# Patient Record
Sex: MALE | Race: ASIAN | NOT HISPANIC OR LATINO
[De-identification: names, ages, dates, MRNs, and addresses within clinical notes are randomized per-mention and may not be internally consistent; named-entity substitution may affect disease eponyms.]

---

## 2017-04-06 ENCOUNTER — APPOINTMENT (OUTPATIENT)
Dept: CARDIOLOGY | Facility: CLINIC | Age: 77
End: 2017-04-06

## 2017-04-06 VITALS
HEART RATE: 74 BPM | SYSTOLIC BLOOD PRESSURE: 105 MMHG | OXYGEN SATURATION: 95 % | BODY MASS INDEX: 23.5 KG/M2 | DIASTOLIC BLOOD PRESSURE: 67 MMHG | TEMPERATURE: 97.4 F | RESPIRATION RATE: 16 BRPM | WEIGHT: 148 LBS

## 2017-04-06 DIAGNOSIS — R07.9 CHEST PAIN, UNSPECIFIED: ICD-10-CM

## 2017-10-11 ENCOUNTER — NON-APPOINTMENT (OUTPATIENT)
Age: 77
End: 2017-10-11

## 2017-10-11 ENCOUNTER — APPOINTMENT (OUTPATIENT)
Dept: CARDIOLOGY | Facility: CLINIC | Age: 77
End: 2017-10-11
Payer: MEDICARE

## 2017-10-11 VITALS
OXYGEN SATURATION: 95 % | BODY MASS INDEX: 23.18 KG/M2 | WEIGHT: 146 LBS | DIASTOLIC BLOOD PRESSURE: 65 MMHG | SYSTOLIC BLOOD PRESSURE: 110 MMHG | TEMPERATURE: 98.3 F | RESPIRATION RATE: 17 BRPM | HEART RATE: 84 BPM

## 2017-10-11 DIAGNOSIS — J44.9 CHRONIC OBSTRUCTIVE PULMONARY DISEASE, UNSPECIFIED: ICD-10-CM

## 2017-10-11 PROCEDURE — 93000 ELECTROCARDIOGRAM COMPLETE: CPT | Mod: 59

## 2017-10-11 PROCEDURE — 99214 OFFICE O/P EST MOD 30 MIN: CPT

## 2018-04-11 ENCOUNTER — APPOINTMENT (OUTPATIENT)
Dept: CARDIOLOGY | Facility: CLINIC | Age: 78
End: 2018-04-11
Payer: MEDICARE

## 2018-04-11 ENCOUNTER — NON-APPOINTMENT (OUTPATIENT)
Age: 78
End: 2018-04-11

## 2018-04-11 VITALS
BODY MASS INDEX: 23.18 KG/M2 | WEIGHT: 146 LBS | OXYGEN SATURATION: 95 % | DIASTOLIC BLOOD PRESSURE: 58 MMHG | TEMPERATURE: 98.6 F | SYSTOLIC BLOOD PRESSURE: 100 MMHG | RESPIRATION RATE: 17 BRPM

## 2018-04-11 PROCEDURE — 93000 ELECTROCARDIOGRAM COMPLETE: CPT | Mod: 59

## 2018-04-11 PROCEDURE — 99215 OFFICE O/P EST HI 40 MIN: CPT

## 2018-09-21 ENCOUNTER — NON-APPOINTMENT (OUTPATIENT)
Age: 78
End: 2018-09-21

## 2018-09-21 ENCOUNTER — APPOINTMENT (OUTPATIENT)
Dept: CARDIOLOGY | Facility: CLINIC | Age: 78
End: 2018-09-21
Payer: MEDICARE

## 2018-09-21 VITALS
RESPIRATION RATE: 18 BRPM | OXYGEN SATURATION: 95 % | WEIGHT: 140 LBS | SYSTOLIC BLOOD PRESSURE: 108 MMHG | DIASTOLIC BLOOD PRESSURE: 66 MMHG | BODY MASS INDEX: 22.23 KG/M2 | HEART RATE: 74 BPM

## 2018-09-21 DIAGNOSIS — R05 COUGH: ICD-10-CM

## 2018-09-21 DIAGNOSIS — Z87.898 PERSONAL HISTORY OF OTHER SPECIFIED CONDITIONS: ICD-10-CM

## 2018-09-21 DIAGNOSIS — R00.0 TACHYCARDIA, UNSPECIFIED: ICD-10-CM

## 2018-09-21 DIAGNOSIS — R06.09 OTHER FORMS OF DYSPNEA: ICD-10-CM

## 2018-09-21 PROCEDURE — 93000 ELECTROCARDIOGRAM COMPLETE: CPT | Mod: 59

## 2018-09-21 PROCEDURE — 99215 OFFICE O/P EST HI 40 MIN: CPT

## 2019-03-22 ENCOUNTER — APPOINTMENT (OUTPATIENT)
Dept: CARDIOLOGY | Facility: CLINIC | Age: 79
End: 2019-03-22

## 2019-08-02 ENCOUNTER — APPOINTMENT (OUTPATIENT)
Dept: CARDIOLOGY | Facility: CLINIC | Age: 79
End: 2019-08-02
Payer: MEDICARE

## 2019-08-02 ENCOUNTER — NON-APPOINTMENT (OUTPATIENT)
Age: 79
End: 2019-08-02

## 2019-08-02 VITALS
OXYGEN SATURATION: 94 % | DIASTOLIC BLOOD PRESSURE: 76 MMHG | WEIGHT: 128 LBS | HEART RATE: 75 BPM | BODY MASS INDEX: 20.33 KG/M2 | RESPIRATION RATE: 18 BRPM | TEMPERATURE: 97.5 F | SYSTOLIC BLOOD PRESSURE: 130 MMHG

## 2019-08-02 DIAGNOSIS — I25.10 ATHEROSCLEROTIC HEART DISEASE OF NATIVE CORONARY ARTERY W/OUT ANGINA PECTORIS: ICD-10-CM

## 2019-08-02 DIAGNOSIS — R06.02 SHORTNESS OF BREATH: ICD-10-CM

## 2019-08-02 PROCEDURE — 93000 ELECTROCARDIOGRAM COMPLETE: CPT

## 2019-08-02 PROCEDURE — 93306 TTE W/DOPPLER COMPLETE: CPT

## 2019-08-02 PROCEDURE — 99214 OFFICE O/P EST MOD 30 MIN: CPT

## 2019-08-02 NOTE — DISCUSSION/SUMMARY
[Unlikely Cardiac Ischemia (Low Prob.)] : chest pain unlikely to represent cardiac ischemia (low probability) [Non-Cardiac] : non-cardiac chest pain [Coronary Artery Disease] : coronary artery disease [Stable Angina] : stable angina [Weight Reduction] : weight reduction [Hypertension] : hypertension [Responding to Treatment] : responding to treatment [Weight Loss] : weight loss [Sodium Restriction] : sodium restriction [Compensated] : compensated [Mild Mitral Regurgitation] : mild mitral regurgitation [Minutes spent___] : for [unfilled] ~Uminutes [Family] : the patient's family [With Me] : with me [___ Month(s)] : [unfilled] month(s) [Bronchitis] : bronchitis [COPD] : chronic obstructive pulmonary disease [Stable] : stable [Chest X-Ray] : a chest x-ray [None] : none [Echocardiogram] : an echocardiogram [Patient] : the patient [Low Sodium Diet] : low sodium diet [de-identified] : proving by repeated angiogram and stress test., no recurrence, resolved [de-identified] : not a current issue, no recurrence. [de-identified] : observation only [de-identified] :  no recurrence, heart rate is normal now  [de-identified] : pr pulmonary fibrosis [de-identified] : current issue [de-identified] : my need PFT, or CT scan [de-identified] : inhalation therapy [FreeTextEntry1] : The chronic cough is from the lung. But coughing caused the chest  uneasiness with the CAD history, it is indicated for using cough suppression agent. Return patient to his PMD for  f/u coughing.

## 2019-08-02 NOTE — REVIEW OF SYSTEMS
[see HPI] : see HPI [Cough] : cough [Negative] : Heme/Lymph [Fever] : no fever [Chills] : no chills [Headache] : no headache [Feeling Fatigued] : not feeling fatigued [Shortness Of Breath] : no shortness of breath [Chest  Pressure] : no chest pressure [Dyspnea on exertion] : not dyspnea during exertion [Lower Ext Edema] : no extremity edema [Chest Pain] : no chest pain [Leg Claudication] : no intermittent leg claudication [Palpitations] : no palpitations [Coughing Up Blood] : no hemoptysis [Wheezing] : no wheezing [Abdominal Pain] : no abdominal pain [Nausea] : no nausea [Heartburn] : no heartburn [Vomiting] : no vomiting [Change in Appetite] : no change in appetite [Change In The Stool] : no change in stool [Joint Pain] : no joint pain [Dysphagia] : no dysphagia [Joint Swelling] : no joint swelling [Joint Stiffness] : no joint stiffness [Muscle Cramps] : no muscle cramps [Limb Weakness (Paresis)] : no limb weakness [Itching] : no itching [Skin: A Rash] : no rash: [Skin Lesions] : no skin lesions [Change In Color Of Skin] : change in skin color [Tremor] : no tremor was seen [Dizziness] : no dizziness [Numbness (Hypesthesia)] : no numbness [Convulsions] : no convulsions [Tingling (Paresthesia)] : no tingling [Excessive Thirst] : no polydipsia [Easy Bleeding] : no tendency for easy bleeding [Swollen Glands] : no swollen glands [Easy Bruising] : no tendency for easy bruising [Swollen Glands In The Neck] : no swollen glands in the neck

## 2019-08-02 NOTE — PHYSICAL EXAM
[General Appearance - Well Developed] : well developed [Normal Appearance] : normal appearance [Well Groomed] : well groomed [General Appearance - Well Nourished] : well nourished [No Deformities] : no deformities [General Appearance - In No Acute Distress] : no acute distress [Normal Conjunctiva] : the conjunctiva exhibited no abnormalities [Eyelids - No Xanthelasma] : the eyelids demonstrated no xanthelasmas [No Oral Pallor] : no oral pallor [Normal Oral Mucosa] : normal oral mucosa [Normal Jugular Venous A Waves Present] : normal jugular venous A waves present [No Oral Cyanosis] : no oral cyanosis [Normal Jugular Venous V Waves Present] : normal jugular venous V waves present [No Jugular Venous Staples A Waves] : no jugular venous staples A waves [Exaggerated Use Of Accessory Muscles For Inspiration] : no accessory muscle use [Respiration, Rhythm And Depth] : normal respiratory rhythm and effort [Auscultation Breath Sounds / Voice Sounds] : lungs were clear to auscultation bilaterally [Lungs Percussion] : the lungs were normal to percussion [Chest Palpation] : palpation of the chest revealed no abnormalities [Heart Rate And Rhythm] : heart rate and rhythm were normal [Heart Sounds] : normal S1 and S2 [Murmurs] : no murmurs present [Edema] : no peripheral edema present [Arterial Pulses Normal] : the arterial pulses were normal [Veins - Varicosity Changes] : no varicosital changes were noted in the lower extremities [Abdomen Tenderness] : non-tender [Abdomen Soft] : soft [Bowel Sounds] : normal bowel sounds [Abdomen Mass (___ Cm)] : no abdominal mass palpated [Abdomen Hernia] : no hernia was discovered [Abnormal Walk] : normal gait [Gait - Sufficient For Exercise Testing] : the gait was sufficient for exercise testing [Cyanosis, Localized] : no localized cyanosis [Nail Clubbing] : no clubbing of the fingernails [Petechial Hemorrhages (___cm)] : no petechial hemorrhages [Skin Color & Pigmentation] : normal skin color and pigmentation [Skin Turgor] : normal skin turgor [] : no rash [No Venous Stasis] : no venous stasis [Skin Lesions] : no skin lesions [No Skin Ulcers] : no skin ulcer [No Xanthoma] : no  xanthoma was observed [Oriented To Time, Place, And Person] : oriented to person, place, and time [Impaired Insight] : insight and judgment were intact [Mood] : the mood was normal [Affect] : the affect was normal [No Anxiety] : not feeling anxious [FreeTextEntry1] : operational scar for throat nodule as described by patient.

## 2019-08-02 NOTE — HISTORY OF PRESENT ILLNESS
[FreeTextEntry1] : He was doing well until recently with increasing and gradual worsening of exertional shortness of breath. He is only able to walk half block now. He came to my office  requiring [address] from [address].\par Patient had  pulmonary  process  with recurrent tumor under the chemo and RT.\par He has no active complaints.  He has  no more chest pain. He had stress echocardiogram in APr. 2015 without significant abnormality.\par He has chronic coughing according to him.\par He has no chest pain, no resting shortness of breath, no dizziness or palpitations.

## 2019-08-02 NOTE — REASON FOR VISIT
[Follow-Up - Clinic] : a clinic follow-up of [Abnormal Test Result] : an abnormal test result [Chest Pain] : chest pain [Coronary Artery Disease] : coronary artery disease [Hyperlipidemia] : hyperlipidemia [Hypertension] : hypertension [Medication Management] : Medication management [Dyspnea] : dyspnea [FreeTextEntry1] : post stent/angiogram,

## 2019-10-03 ENCOUNTER — APPOINTMENT (OUTPATIENT)
Dept: THORACIC SURGERY | Facility: CLINIC | Age: 79
End: 2019-10-03
Payer: MEDICARE

## 2019-10-03 VITALS
WEIGHT: 124 LBS | HEART RATE: 110 BPM | OXYGEN SATURATION: 93 % | DIASTOLIC BLOOD PRESSURE: 59 MMHG | SYSTOLIC BLOOD PRESSURE: 94 MMHG | BODY MASS INDEX: 19.69 KG/M2 | RESPIRATION RATE: 17 BRPM | TEMPERATURE: 98 F

## 2019-10-03 DIAGNOSIS — Z87.891 PERSONAL HISTORY OF NICOTINE DEPENDENCE: ICD-10-CM

## 2019-10-03 DIAGNOSIS — C77.1 SECONDARY AND UNSPECIFIED MALIGNANT NEOPLASM OF INTRATHORACIC LYMPH NODES: ICD-10-CM

## 2019-10-03 DIAGNOSIS — I34.0 NONRHEUMATIC MITRAL (VALVE) INSUFFICIENCY: ICD-10-CM

## 2019-10-03 DIAGNOSIS — C34.91 MALIGNANT NEOPLASM OF UNSPECIFIED PART OF RIGHT BRONCHUS OR LUNG: ICD-10-CM

## 2019-10-03 DIAGNOSIS — E78.5 HYPERLIPIDEMIA, UNSPECIFIED: ICD-10-CM

## 2019-10-03 DIAGNOSIS — J42 UNSPECIFIED CHRONIC BRONCHITIS: ICD-10-CM

## 2019-10-03 DIAGNOSIS — I10 ESSENTIAL (PRIMARY) HYPERTENSION: ICD-10-CM

## 2019-10-03 PROCEDURE — 99205 OFFICE O/P NEW HI 60 MIN: CPT

## 2019-10-04 PROBLEM — C77.1 METASTASIS TO MEDIASTINAL LYMPH NODE: Status: ACTIVE | Noted: 2019-10-04

## 2019-10-04 PROBLEM — C34.91 ADENOCARCINOMA OF LUNG, RIGHT: Status: ACTIVE | Noted: 2019-10-04

## 2019-10-04 RX ORDER — METOPROLOL TARTRATE 50 MG/1
50 TABLET, FILM COATED ORAL
Refills: 0 | Status: ACTIVE | COMMUNITY

## 2019-10-04 RX ORDER — BUDESONIDE AND FORMOTEROL FUMARATE DIHYDRATE 160; 4.5 UG/1; UG/1
160-4.5 AEROSOL RESPIRATORY (INHALATION)
Refills: 0 | Status: ACTIVE | COMMUNITY

## 2019-10-04 RX ORDER — HYDROCODONE BITARTRATE AND HOMATROPINE METHYLBROMIDE 5; 1.5 MG/5ML; MG/5ML
5-1.5 SOLUTION ORAL
Qty: 240 | Refills: 0 | Status: COMPLETED | COMMUNITY
Start: 2018-09-21 | End: 2019-10-04

## 2019-10-04 RX ORDER — GLIPIZIDE 5 MG/1
5 TABLET, EXTENDED RELEASE ORAL
Refills: 0 | Status: ACTIVE | COMMUNITY

## 2019-10-04 RX ORDER — TAMSULOSIN HYDROCHLORIDE 0.4 MG/1
0.4 CAPSULE ORAL
Refills: 0 | Status: ACTIVE | COMMUNITY

## 2019-10-04 NOTE — CONSULT LETTER
[Dear  ___] : Dear  [unfilled], [Consult Letter:] : I had the pleasure of evaluating your patient, [unfilled]. [( Thank you for referring [unfilled] for consultation for _____ )] : Thank you for referring [unfilled] for consultation for [unfilled] [Please see my note below.] : Please see my note below. [Consult Closing:] : Thank you very much for allowing me to participate in the care of this patient.  If you have any questions, please do not hesitate to contact me. [Sincerely,] : Sincerely, [DrBob  ___] : Dr. DOMÍNGUEZ [DrBob ___] : Dr. DOMÍNGUEZ [FreeTextEntry2] : Dr. Jan Scott (Pul/Referring)\par Dr. Eliseo Melgar (Cardiology)\par Kristina Dupont MD (Hem/Onc)\par Johnathan Donato MD (Rad/Onc) [FreeTextEntry3] : Sam Altamirano MD, MPH \par System Director of Thoracic Surgery \par Director of Comprehensive Lung and Foregut [facility] \par Professor Cardiovascular & Thoracic Surgery  \par [facility] [employer] at [facility]

## 2019-10-04 NOTE — HISTORY OF PRESENT ILLNESS
[FreeTextEntry1] : 78 y/o male, former smoker, w/ hx of A-Fib, CAD s/p stent x2, HTN, HLD, DM2, and lung CA\par \par Now 5.5 yrs s/p Rt VATS, RUL wedge rxn, completion RULobectomy, MLND on 3/17/14 at [facility]. Path revealed AdenoCA, 1.5cm, pT1aN0 Stg IA.\par \par CT Chest on 7/15/15 showed increasing size mediastinal lymphadenopathy.\par EBUS Bx on 8/24/15 at [facility]. Path of Rt Level 4 LN revealed +AdenoCA, morphologically similar to prior RUL wedge specimen. Adj Chemo/XRT completed 10/2015 by Hem/Onc Dr. Kristina Dupont and Rad/Onc Dr. Johnathan Donato.\par \par Pt was followed by Pul Dr. Jan Scott since 2016.\par Pt was seen by Dr. Scott 9/9/19 c/o worsening ZHAO.  D-dimer was elevated and CTA done.\par \par PFT on 9/9/19:   FVC 55%, FEV1 60%, DLCO 17%\par \par CTA on 9/16/19:\par - no evidence of pulmonary embolism\par - increased subcarinal lymphadenopathy with a subcarinal LN measuring 1.3 cm, previously 9 mm 12/2018.\par - slightly worsening severe pulmonary fibrosis with honeycombing and bronchiectasis\par - right apical pleural thickening\par - small left pleural effusion\par \par Pt is here today for initial Thoracic Sx. Consultation, referred by Dr. Scott. Using 2-3L N/C, SpO2 71% on room air. Admits to productive cough with brownish sputum x 2 mo, SOB, fatigue.

## 2019-10-04 NOTE — ASSESSMENT
[FreeTextEntry1] : 78 y/o male, former smoker, w/ hx of A-Fib, CAD s/p stent x2, HTN, HLD, DM2, and lung CA\par \par Now 5.5 yrs s/p Rt VATS, RUL wedge rxn, completion RULobectomy, MLND on 3/17/14 at [facility]. Path revealed AdenoCA, 1.5cm, pT1aN0 Stg IA.\par \par CT Chest on 7/15/15 showed increasing size mediastinal lymphadenopathy.\par EBUS Bx on 8/24/15 at [facility]. Path of Rt Level 4 LN revealed +AdenoCA, morphologically similar to prior RUL wedge specimen. Adj Chemo/XRT completed 10/2015 by Hem/Onc Dr. Kristina Dupont and Rad/Onc Dr. Johnathan Donato.\par \par Pt was followed by Pul Dr. Jan Scott since 2016.\par Pt was seen by Dr. Scott 9/9/19 c/o worsening ZHAO.  D-dimer was elevated and CTA done.\par \par PFT on 9/9/19:   FVC 55%, FEV1 60%, DLCO 17%\par \par CTA on 9/16/19:\par - no evidence of pulmonary embolism\par - increased subcarinal lymphadenopathy with a subcarinal LN measuring 1.3 cm, previously 9 mm 12/2018.\par - slightly worsening severe pulmonary fibrosis with honeycombing and bronchiectasis\par - right apical pleural thickening\par - small left pleural effusion\par \par I have reviewed the patient's medical records and diagnostic images at time of this office consultation and have made the following recommendation:\par 1. Patient has very poor functional status, and will be at extreme high risks for any surgical procedures, I recommended a PET/CT scan and I will call patient with result and plan of care.\par \par \par Written by Antony Santacruz NP, acting as a scribe for Dr. Sam Altamirano.\par \par The documentation recorded by the scribe accurately reflects the service I personally performed and the decisions made by me. SAM ALTAMIRANO MD\par

## 2019-10-04 NOTE — DATA REVIEWED
[FreeTextEntry1] : CTA on 9/16/19:\par - no evidence of pulmonary embolism\par - increased subcarinal lymphadenopathy with a subcarinal LN measuring 1.3 cm, previously 9 mm 12/2018.\par - slightly worsening severe pulmonary fibrosis with honeycombing and bronchiectasis\par - right apical pleural thickening\par - small left pleural effusion

## 2019-10-04 NOTE — PHYSICAL EXAM
[General Appearance - Alert] : alert [General Appearance - In No Acute Distress] : in no acute distress [Sclera] : the sclera and conjunctiva were normal [PERRL With Normal Accommodation] : pupils were equal in size, round, and reactive to light [Extraocular Movements] : extraocular movements were intact [Outer Ear] : the ears and nose were normal in appearance [Oropharynx] : the oropharynx was normal [Neck Appearance] : the appearance of the neck was normal [Neck Cervical Mass (___cm)] : no neck mass was observed [Jugular Venous Distention Increased] : there was no jugular-venous distention [Thyroid Diffuse Enlargement] : the thyroid was not enlarged [Thyroid Nodule] : there were no palpable thyroid nodules [Heart Rate And Rhythm] : heart rate was normal and rhythm regular [Heart Sounds] : normal S1 and S2 [Murmurs] : no murmurs [Heart Sounds Gallop] : no gallops [Heart Sounds Pericardial Friction Rub] : no pericardial rub [Examination Of The Chest] : the chest was normal in appearance [Chest Visual Inspection Thoracic Asymmetry] : no chest asymmetry [Diminished Respiratory Excursion] : normal chest expansion [2+] : left 2+ [Breast Appearance] : normal in appearance [Breast Palpation Mass] : no palpable masses [Bowel Sounds] : normal bowel sounds [Abdomen Soft] : soft [Abdomen Tenderness] : non-tender [Abdomen Mass (___ Cm)] : no abdominal mass palpated [Cervical Lymph Nodes Enlarged Posterior Bilaterally] : posterior cervical [Cervical Lymph Nodes Enlarged Anterior Bilaterally] : anterior cervical [No CVA Tenderness] : no ~M costovertebral angle tenderness [Supraclavicular Lymph Nodes Enlarged Bilaterally] : supraclavicular [No Spinal Tenderness] : no spinal tenderness [Nail Clubbing] : no clubbing  or cyanosis of the fingernails [Musculoskeletal - Swelling] : no joint swelling seen [Skin Color & Pigmentation] : normal skin color and pigmentation [Deep Tendon Reflexes (DTR)] : deep tendon reflexes were 2+ and symmetric [] : no rash [Skin Turgor] : normal skin turgor [Sensation] : the sensory exam was normal to light touch and pinprick [Oriented To Time, Place, And Person] : oriented to person, place, and time [No Focal Deficits] : no focal deficits [Impaired Insight] : insight and judgment were intact [Affect] : the affect was normal [FreeTextEntry1] : walking with a walker